# Patient Record
Sex: MALE | Race: WHITE | NOT HISPANIC OR LATINO | URBAN - METROPOLITAN AREA
[De-identification: names, ages, dates, MRNs, and addresses within clinical notes are randomized per-mention and may not be internally consistent; named-entity substitution may affect disease eponyms.]

---

## 2018-06-01 ENCOUNTER — OUTPATIENT (OUTPATIENT)
Dept: OUTPATIENT SERVICES | Facility: HOSPITAL | Age: 53
LOS: 1 days | End: 2018-06-01
Payer: MEDICAID

## 2018-06-01 PROCEDURE — G9001: CPT

## 2018-06-22 DIAGNOSIS — R69 ILLNESS, UNSPECIFIED: ICD-10-CM

## 2018-07-01 ENCOUNTER — OUTPATIENT (OUTPATIENT)
Dept: OUTPATIENT SERVICES | Facility: HOSPITAL | Age: 53
LOS: 1 days | End: 2018-07-01

## 2018-07-06 DIAGNOSIS — Z71.89 OTHER SPECIFIED COUNSELING: ICD-10-CM

## 2018-08-01 ENCOUNTER — OUTPATIENT (OUTPATIENT)
Dept: OUTPATIENT SERVICES | Facility: HOSPITAL | Age: 53
LOS: 1 days | End: 2018-08-01

## 2018-08-05 ENCOUNTER — EMERGENCY (EMERGENCY)
Facility: HOSPITAL | Age: 53
LOS: 1 days | Discharge: OTHER INSTITUTION | End: 2018-08-05
Attending: EMERGENCY MEDICINE
Payer: MEDICARE

## 2018-08-05 VITALS — WEIGHT: 177.91 LBS | HEIGHT: 66 IN

## 2018-08-05 PROCEDURE — 99285 EMERGENCY DEPT VISIT HI MDM: CPT | Mod: 25

## 2018-08-05 NOTE — ED PROVIDER NOTE - GASTROINTESTINAL NEGATIVE STATEMENT, MLM
PSYCHIATRY INPATIENT ADMISSION NOTE    Patient: Arjun Davis  Date: 2017    :     1968  Attending: Rudy Son MD      SOURCE OF INFORMATION:  I based this report upon my review of information from written and electronic medical records and upon my interview and assessment of the patient's condition.    CHIEF COMPLAINT: \"I had this panic attack\"    This admission was Voluntary.    HISTORY OF PRESENTING ILLNESS:  Mr Davis describes that every few years he ends up experiencing episodes of extreme anxiety which he suspects are usually triggered by life stressors. He then starts talking about some of the significant stressors that he has recently been involved with such as the stress at his job because recently he was promoted into a position that requires more of his time. He briefly talks about an incident at his job that happened which required much of his time. He also talks about stressors at home.  He has had increasing anxiety because of these stressors and wanted to make an appointment with his outpatient psychiatrist but was unable to do so in a timely manner and so, he ended up going to the emergency department and this is where he had a panic attack. Per emergency department notes, patient began to run in the hallway frantically saying \"I'm going to kill myself\". At one point he even ran into the bathroom and shut the door. When security arrived, patient calmed down and was redirected back to his room. Once he was back in his room, he stated that he does not want to kill himself and that he was very scared. He describes his panic attacks as \"worried about dying\". He also endorses many other symptoms such as fear of losing control, palpitations, sweating, trembling, shortness of breath. In the past, he has had thoughts that he may end up \"losing my soul\". He has also had thoughts that the \"world may be ending\".     He recalls that Zyprexa has helped him with such panic attacks in the past  however, he has gained tremendous amount of weight with this medication. He wonders if he should be prescribed this medication for the next few weeks and then it can be tapered off after that. It was explained to patient that what he has been experiencing for the last few years are likely panic attacks. There is no evidence that makes me suspect that this patient has an underlying psychotic disorder. It was discussed with him that medications such as Zoloft which he has been on in the past are FDA approved for depression, anxiety and panic disorder. On the other hand, medication such as Wellbutrin although they help raise a persons energy levels, such medication can exacerbate anxiety and potentially also lead to panic attacks. It was recommended to him that he should consider getting back on Zoloft and perhaps consider tapering off Wellbutrin in the near future. He said that he will think about this. As far as Zyprexa goes, I do not see any indication to start this medication now that the panic attacks has abated and to expose patient to the metabolic side effects of Zyprexa. All this was discussed with him and he acknowledged understanding of this.     PSYCH REVIEW OF SYSTEMS:    - Depression: Patient reports feeling hopeless, helpless, decreased concentration, decreased energy, guilty feelings, decreased interest.  - Anxiety: Patient reports feeling tense at shoulders/neck, worrying about day to day things, feeling fatigue, restless, irritable  - Panic disorder:  Patient reported clear history of recurrent panic attacks  - Bipolar disorder:  Patient reported no clear history of > 7day episodes of elevated, expansive or irritable mood, accompanied by decreased need for sleep, rapid or disorganized thoughts, grandiosity, and high risk behavior.  - Post-Traumatic Stress Disorder:  Patient reported no history of traumatic events followed by reexperiencing, avoidance, and hyper-arousal symptoms  - OCD:  Patient  reported no clear history of intrusive, bothersome and interfering obsessions or compulsive rituals   - Appetite: fair  - Sleep: fair  - Auditory/Visual hallucinations/Paranoia: Denies  - Suicidal behavior:  Denies  - Homicidal thoughts: Denies  - ADHD:  Patient reported no clear history of ADHD like persistent inattention and hyperactivity/impulsivity symptoms  - Eating disorder:  Patient reported no clear history of bingeing, purging, restricting or other eating disordered behaviors  - Trichotillomania: Denies    Past Psychiatric History (patient)   Past psychiatric diagnosis: Delusional disorder, bipolar disorder, psychotic disorder, depression, seasonal affective disorder  Current psychiatrist: Dr Brachmann  Current therapist: Lukas  Past psychotropic trials: Zyprexa, Zoloft, Geodon, Prozac, Latuda  Past psychiatric hospitalizations: None  Past chemical dependence programs: None   Self injurious behaviors/suicide attempts: None  Violence towards others: None  Past commitments: None  ECT history: None    Past Psychiatric History (family)   Suicide attempts: none  Chemical dependence: none  Mental illness:  Brother may have mental illness    Social History  Family/Friends: Wife of 18 years. 2 children  Job:  position at a construction company.  School: Bachelor's degree  Living situation: With his wife and children  Past abuse:None   history: None  Current stressors: See HPI for details.  Guns access: Yes but he denies SI  Past/current legal issues: None    Substance Use:   Alcohol: Denies  Cocaine/crack: Denies  Meth: Denies  Heroin: Denies  Cannabis: None  Cigarettes: Denies    MEDICAL HISTORY:  I have reviewed and updated the electronic health record regarding significant medical and psychiatric disorders. Findings of significance include:    Past Medical History   Diagnosis Date   • Anxiety    • Depressive disorder    • RAD (reactive airway disease)        Vitals:    01/31/17 0840   BP: (!)  137/102   Pulse: 91   Resp:    Temp:        Past Surgical History   Procedure Laterality Date   • Breast surgery       removal of extra breast tissue         MEDICATIONS:  Outpatient medications:  Prescriptions Prior to Admission   Medication Sig Dispense Refill   • vitamin - therapeutic multivitamins w/minerals (CENTRUM SILVER,THERA-M) Tab Take 1 tablet by mouth daily.     • cetirizine (ZYRTEC) 10 MG tablet Take 10 mg by mouth daily.     • UNKNOWN TO PATIENT Take 1 capsule by mouth 2 times daily as needed (diet suppressant).     • buPROPion (WELLBUTRIN SR) 200 MG 12 hr tablet TAKE 1 TABLET BY MOUTH DAILY 90 tablet 2   • ADVAIR DISKUS 250-50 MCG/DOSE AEROSOL POWDER, BREATH ACTIVATED INHALE 1 PUFF BY MOUTH INTO THE LUNGS TWICE DAILY (Patient taking differently: daily) 1 each 0   • albuterol 108 (90 BASE) MCG/ACT inhaler Inhale 2 puffs into the lungs every 4 hours as needed for Shortness of Breath or Wheezing. 1 Inhaler 5     Scheduled meds:  • loratadine  10 mg Oral QAM AC   • sertraline  50 mg Oral Daily   • fluticasone-salmeterol  1 puff Inhalation 2 times per day   • buPROPion  200 mg Oral Daily     Prn meds  albuterol, zolpidem, benztropine mesylate **OR** benztropine, LORazepam **OR** LORazepam, OLANZapine, haloperidol **OR** haloperidol, docusate sodium-sennosides, aluminum-magnesium hydroxide-simethicone, ondansetron, loperamide    ALLERGIES:   Allergen Reactions   • Geodon [Ziprasidone Hydrochloride] Other (See Comments)     Tongue thrusting     FAMILY HISTORY:  Family History   Problem Relation Age of Onset   • Depression Brother    • Heart disease Father    • Cancer Father      breast ca   • Heart disease Paternal Grandmother    • Cancer Maternal Grandfather      colon ca       MEDICAL REVIEW OF SYSTEMS:  Constitutional:  Denies fever/sweats  Cardiovascular:  Denies new onset chest pain, palpitations  Respiratory:  Denies cough, shortness of breath  Gastrointestinal:  Denies abdominal pain/cramping, nausea,  vomiting  Musculoskeletal:  Denies new onset joint pain, back, neck pain  Skin:  Denies new spot on skin, lumps, or bumps  Neurologic:  Denies new onset sensory loss or weakness    PHYSICAL EXAM  Initial admission consultation ordered and reviewed.    MENTAL STATUS EXAM  General appearance: Distressed  Speech  soft  Volume: soft  Latency: prolonged  Mood/affect: depressed, anxious  Psychomotor: slow  Associations: intact  Thought process: intact  Thought content: Denies HI/SI.   Perceptual disorders/hallucinations: none  Orientation: oriented to person, place, time, and general circumstances  Attention: decreased  Concentration: decreased  Language: Normal  Fund of knowledge: average  Memory: no apparent impairments in short term memory and no apparent impairments in long term memory   Insight: fair  Judgement: limited    ASSESSMENT:  Principal Admission Diagnosis  Major depressive disorder, recurrent, moderate  Panic disorder    Evidence for 2 overnight stay: depression    TREATMENT PLAN    Medications: Start Zoloft. Consider tapering Wellbutrin.  Labs:labs reviewed  Precautions:unit restriction with q 15 minute safety checks because of significant suicidal/self-injury risk  Nicotine replacement -  none  Medical consult for history and physical   Milieu therapy  Diet: Regular expect foods, if any, in allergy list.  Broset violence protocol.  Full resuscitation  Vitals  Psychoeducation, supportive therapy, recovery oriented, compliance, relapse prevention as appropriate  Collateral: none today  Symptoms discussed and validated, support and psycho-education provided. Discussed diagnosis, recommended treatment, alternative treatment options, and the right to refuse treatment. Risks and benefits of psychotropic medications discussed, including but not limited to serotonin syndrome, NMS, TD, akathesia, movement disorders, GI    distress, headaches, dizziness, allergy/rash, metabolic syndrome, weight gain, sexual  dysfunction, increased SI and seizure. Risks of combining medications with alcohol/illicits discussed. Pt was given opportunity to ask questions, there was no educational barriers to learning and all questions were answered. Pt verbalized understanding of medication education and training, voiced understanding of treatment plan, acceptance of potential risks and consequences, and has provided informed consent and agreed to take these medications.     DISCHARGE PLAN  Patient can sustain progress and resolve remaining treatment goals in a less restrictive environment (i.e., IOP, PHP, Outpatient Services).    Estimated length of stay: 4-5 days     Next Level of Care: PHP vs IOP vs Outpatient care.    The patient will be discharged to the next level of care when inpatient care is no longer necessary due to adequate patient stabilization or improvement as indicated by ALL of the following:    Inpatient care no longer appropriate du to patient progress record or consent as indicated by 1 or more of the following: Patient can sustain progress and resolve remaining treatment goals in a less restrictive environment (i.e., IOP, PHP, Outpatient Services).    RISK Factors:  Risk of violence to self  Attempts of suicide in past 6 months:No    History of interpersonal violence prior to 6 months:  History of arrests for serious violent crime (robbery, sexual assault, assault/battery, weapons charge, murder) in the past six months:No    Psychological trauma screening  Lifetime history physical, sexual, emotional or verbal abuse:  Have you ever been verbally, emotionally, physically or sexually abused:No  At what age: Not applicable    Lifetime drug use:  Reported pattern of substance abuse within the last 12 months:No    Lifetime alcohol use:  Reported pattern of alcohol use within the last 12 months:No    Strengths (minimum of two): Internally motivated to seek treatment, Verbalizes optimism that change can occur, Willing to  obtain outpatient follow-up treatment after discharge from current level of care     This information is confidential. Any disclosure without the patient's consent or Statutory Authorization is prohibited by law.    Rudy Son MD  Inpatient Psychiatry  Pager: [phone]     no abdominal pain, no bloating, no constipation, no diarrhea, no nausea and no vomiting.

## 2018-08-05 NOTE — ED PROVIDER NOTE - OBJECTIVE STATEMENT
52 y/o M pt presents to ED with his wife for increase depression and suicidal ideation. Pt admits to drinking alcohol and states he's depressed and wants to shoot himself in the head. Hx of Inpatient hospitalization 10 years ago; no medication given.denies fever. denies HA or neck pain. no chest pain or sob. no abd pain. no n/v/d. no urinary f/u/d. no back pain. no motor or sensory deficits. denies illicit drug use. no recent travel. no rash. no auditory/visual hallucinations.  no other acute issues symptoms or concerns

## 2018-08-05 NOTE — ED PROVIDER NOTE - PROGRESS NOTE DETAILS
Mushtaq: received sign out, medically cleared prior to my arrival; admit for inpatient psychiatric care

## 2018-08-05 NOTE — ED ADULT TRIAGE NOTE - CHIEF COMPLAINT QUOTE
patients wife states that he is depressed and down, patient states that he is suicidal with no plan - patient acting bizarre in waiting room and was drinking earlier

## 2018-08-06 VITALS
DIASTOLIC BLOOD PRESSURE: 80 MMHG | HEART RATE: 74 BPM | OXYGEN SATURATION: 96 % | RESPIRATION RATE: 18 BRPM | TEMPERATURE: 99 F | SYSTOLIC BLOOD PRESSURE: 122 MMHG

## 2018-08-06 DIAGNOSIS — F10.10 ALCOHOL ABUSE, UNCOMPLICATED: ICD-10-CM

## 2018-08-06 DIAGNOSIS — F14.10 COCAINE ABUSE, UNCOMPLICATED: ICD-10-CM

## 2018-08-06 DIAGNOSIS — R69 ILLNESS, UNSPECIFIED: ICD-10-CM

## 2018-08-06 DIAGNOSIS — F33.1 MAJOR DEPRESSIVE DISORDER, RECURRENT, MODERATE: ICD-10-CM

## 2018-08-06 LAB
ALBUMIN SERPL ELPH-MCNC: 5 G/DL — SIGNIFICANT CHANGE UP (ref 3.3–5.2)
ALP SERPL-CCNC: 95 U/L — SIGNIFICANT CHANGE UP (ref 40–120)
ALT FLD-CCNC: 25 U/L — SIGNIFICANT CHANGE UP
AMPHET UR-MCNC: NEGATIVE — SIGNIFICANT CHANGE UP
ANION GAP SERPL CALC-SCNC: 17 MMOL/L — SIGNIFICANT CHANGE UP (ref 5–17)
APAP SERPL-MCNC: <7.5 UG/ML — LOW (ref 10–26)
APPEARANCE UR: CLEAR — SIGNIFICANT CHANGE UP
AST SERPL-CCNC: 24 U/L — SIGNIFICANT CHANGE UP
BARBITURATES UR SCN-MCNC: NEGATIVE — SIGNIFICANT CHANGE UP
BASOPHILS # BLD AUTO: 0 K/UL — SIGNIFICANT CHANGE UP (ref 0–0.2)
BASOPHILS NFR BLD AUTO: 0.5 % — SIGNIFICANT CHANGE UP (ref 0–2)
BENZODIAZ UR-MCNC: POSITIVE
BILIRUB SERPL-MCNC: 0.3 MG/DL — LOW (ref 0.4–2)
BILIRUB UR-MCNC: NEGATIVE — SIGNIFICANT CHANGE UP
BUN SERPL-MCNC: 10 MG/DL — SIGNIFICANT CHANGE UP (ref 8–20)
CALCIUM SERPL-MCNC: 9.5 MG/DL — SIGNIFICANT CHANGE UP (ref 8.6–10.2)
CHLORIDE SERPL-SCNC: 105 MMOL/L — SIGNIFICANT CHANGE UP (ref 98–107)
CO2 SERPL-SCNC: 23 MMOL/L — SIGNIFICANT CHANGE UP (ref 22–29)
COCAINE METAB.OTHER UR-MCNC: POSITIVE
COLOR SPEC: YELLOW — SIGNIFICANT CHANGE UP
CREAT SERPL-MCNC: 1.04 MG/DL — SIGNIFICANT CHANGE UP (ref 0.5–1.3)
DIFF PNL FLD: NEGATIVE — SIGNIFICANT CHANGE UP
EOSINOPHIL # BLD AUTO: 0.2 K/UL — SIGNIFICANT CHANGE UP (ref 0–0.5)
EOSINOPHIL NFR BLD AUTO: 2.6 % — SIGNIFICANT CHANGE UP (ref 0–5)
ETHANOL SERPL-MCNC: 165 MG/DL — SIGNIFICANT CHANGE UP
GLUCOSE SERPL-MCNC: 84 MG/DL — SIGNIFICANT CHANGE UP (ref 70–115)
GLUCOSE UR QL: NEGATIVE MG/DL — SIGNIFICANT CHANGE UP
HCT VFR BLD CALC: 44.7 % — SIGNIFICANT CHANGE UP (ref 42–52)
HGB BLD-MCNC: 15 G/DL — SIGNIFICANT CHANGE UP (ref 14–18)
KETONES UR-MCNC: NEGATIVE — SIGNIFICANT CHANGE UP
LEUKOCYTE ESTERASE UR-ACNC: NEGATIVE — SIGNIFICANT CHANGE UP
LYMPHOCYTES # BLD AUTO: 3.2 K/UL — SIGNIFICANT CHANGE UP (ref 1–4.8)
LYMPHOCYTES # BLD AUTO: 39.4 % — SIGNIFICANT CHANGE UP (ref 20–55)
MCHC RBC-ENTMCNC: 29.8 PG — SIGNIFICANT CHANGE UP (ref 27–31)
MCHC RBC-ENTMCNC: 33.6 G/DL — SIGNIFICANT CHANGE UP (ref 32–36)
MCV RBC AUTO: 88.9 FL — SIGNIFICANT CHANGE UP (ref 80–94)
METHADONE UR-MCNC: NEGATIVE — SIGNIFICANT CHANGE UP
MONOCYTES # BLD AUTO: 0.5 K/UL — SIGNIFICANT CHANGE UP (ref 0–0.8)
MONOCYTES NFR BLD AUTO: 6.2 % — SIGNIFICANT CHANGE UP (ref 3–10)
NEUTROPHILS # BLD AUTO: 4.1 K/UL — SIGNIFICANT CHANGE UP (ref 1.8–8)
NEUTROPHILS NFR BLD AUTO: 51.2 % — SIGNIFICANT CHANGE UP (ref 37–73)
NITRITE UR-MCNC: NEGATIVE — SIGNIFICANT CHANGE UP
OPIATES UR-MCNC: NEGATIVE — SIGNIFICANT CHANGE UP
PCP SPEC-MCNC: SIGNIFICANT CHANGE UP
PCP UR-MCNC: NEGATIVE — SIGNIFICANT CHANGE UP
PH UR: 5 — SIGNIFICANT CHANGE UP (ref 5–8)
PLATELET # BLD AUTO: 279 K/UL — SIGNIFICANT CHANGE UP (ref 150–400)
POTASSIUM SERPL-MCNC: 3.9 MMOL/L — SIGNIFICANT CHANGE UP (ref 3.5–5.3)
POTASSIUM SERPL-SCNC: 3.9 MMOL/L — SIGNIFICANT CHANGE UP (ref 3.5–5.3)
PROT SERPL-MCNC: 8 G/DL — SIGNIFICANT CHANGE UP (ref 6.6–8.7)
PROT UR-MCNC: NEGATIVE MG/DL — SIGNIFICANT CHANGE UP
RBC # BLD: 5.03 M/UL — SIGNIFICANT CHANGE UP (ref 4.6–6.2)
RBC # FLD: 13.5 % — SIGNIFICANT CHANGE UP (ref 11–15.6)
SALICYLATES SERPL-MCNC: <0.6 MG/DL — LOW (ref 10–20)
SODIUM SERPL-SCNC: 145 MMOL/L — SIGNIFICANT CHANGE UP (ref 135–145)
SP GR SPEC: 1.02 — SIGNIFICANT CHANGE UP (ref 1.01–1.02)
THC UR QL: NEGATIVE — SIGNIFICANT CHANGE UP
TSH SERPL-MCNC: 1.37 UIU/ML — SIGNIFICANT CHANGE UP (ref 0.27–4.2)
UROBILINOGEN FLD QL: NEGATIVE MG/DL — SIGNIFICANT CHANGE UP
WBC # BLD: 8.1 K/UL — SIGNIFICANT CHANGE UP (ref 4.8–10.8)
WBC # FLD AUTO: 8.1 K/UL — SIGNIFICANT CHANGE UP (ref 4.8–10.8)

## 2018-08-06 PROCEDURE — 80307 DRUG TEST PRSMV CHEM ANLYZR: CPT

## 2018-08-06 PROCEDURE — 96374 THER/PROPH/DIAG INJ IV PUSH: CPT

## 2018-08-06 PROCEDURE — 81003 URINALYSIS AUTO W/O SCOPE: CPT

## 2018-08-06 PROCEDURE — 99284 EMERGENCY DEPT VISIT MOD MDM: CPT

## 2018-08-06 PROCEDURE — 99285 EMERGENCY DEPT VISIT HI MDM: CPT | Mod: 25

## 2018-08-06 PROCEDURE — 85027 COMPLETE CBC AUTOMATED: CPT

## 2018-08-06 PROCEDURE — 93005 ELECTROCARDIOGRAM TRACING: CPT

## 2018-08-06 PROCEDURE — 93010 ELECTROCARDIOGRAM REPORT: CPT

## 2018-08-06 PROCEDURE — 36415 COLL VENOUS BLD VENIPUNCTURE: CPT

## 2018-08-06 PROCEDURE — 80053 COMPREHEN METABOLIC PANEL: CPT

## 2018-08-06 PROCEDURE — 84443 ASSAY THYROID STIM HORMONE: CPT

## 2018-08-06 RX ORDER — ASPIRIN/CALCIUM CARB/MAGNESIUM 324 MG
81 TABLET ORAL DAILY
Qty: 0 | Refills: 0 | Status: DISCONTINUED | OUTPATIENT
Start: 2018-08-06 | End: 2018-08-10

## 2018-08-06 RX ADMIN — Medication 81 MILLIGRAM(S): at 13:13

## 2018-08-06 RX ADMIN — Medication 2 MILLIGRAM(S): at 00:30

## 2018-08-06 NOTE — ED BEHAVIORAL HEALTH ASSESSMENT NOTE - HPI (INCLUDE ILLNESS QUALITY, SEVERITY, DURATION, TIMING, CONTEXT, MODIFYING FACTORS, ASSOCIATED SIGNS AND SYMPTOMS)
Pt is a 52 y/o male with PMHx of MI x 3 years ago s/p 2 stents and depression presenting to ED with suicidal ideations and increase in depression x 1 week. Previous history of psychiatric inpatient admission in december of 2006 at [facility] for SI and depression and [facility] March 2016 for SI and depression. Hx of various antidepressants (Zoloft, Prozac, Buspar). Pt states " I can't take it, I feel like my world in crashing in on me." Patient reports suicidal thoughts that have been increasing over the past week but no plan or intent at the moment. Pt states " I feel like going to sleep and just not waking up". Pt reports long history of depression but it started getting worse when he was released 3months ago from assisted after a 6month stay for DWI charges, and has gotten even worse of past week. Pt reports he was sober from alcohol for 2 years but decided to drink yesterday 5-6 beers to "numb the pain", as well as taking some xanax for same reason. Pt reports attending [facility] in [place] and sees a therapist there ([facility]) and reports telling the therapist that he needs more help but they could not give him a psychiatrist appointment until October 2018. Associated insomnia, fatigue, decrease appetite, anhedonia, irritability, feelings of hopelessness, worthlessness, guilty, and burden of others. Denies AH, VH, and delusions. Spoke with mother Tanna at  who reports noticing the patients getting worse after being released from assisted 3 months ago. Mother reports that in past few months he has made comments like " All I'm doing is hurting everyone, I want out", " just put a bullet in my head". Mother reports that before his inpatient admission at [facility] in 2006 he tried to jump from the car, she believes as suicide attempt. Patient's mother states " I really think he needs help, I think he will get worse if he is just discharged and sent home."

## 2018-08-06 NOTE — ED BEHAVIORAL HEALTH ASSESSMENT NOTE - RISK ASSESSMENT
high risk: hx of depression, previous inpatient admission, feelings of burden on others, hopeless, worthless, guilty, anhedonia, recent incarceration, lives with mother, unemployed, , current SI without plan

## 2018-08-06 NOTE — ED ADULT NURSE REASSESSMENT NOTE - NS ED NURSE REASSESS COMMENT FT1
received pt medically cleared by Ed attending, pt awake alert and oriented x3, affect flat, appears to be thought blocking at times, states "sometimes I want to go to sleep and not wake up" pt also states " I'm feed up with life" pt asked about a plan to hurt himself and stated " How about waiting for the next train to come by" pt states he drinks beer daily but denies any illicit drug use, pt admits to a previous in-patient psych admission un sure of how long ago, pt cooperative.

## 2018-08-06 NOTE — ED ADULT NURSE NOTE - OBJECTIVE STATEMENT
Pt. received resting in yellow gown on 1:1 for suicidal thoughts. As per wife pt. has been feeling depressed as of late, admits to drinking tonight, states he wants to "blow his head off". environment safe, patient calm at this time. respirations even and unlabored, requesting something to eat and drink

## 2018-08-06 NOTE — ED BEHAVIORAL HEALTH NOTE - BEHAVIORAL HEALTH NOTE
Social Work Note: clinicals were reviewed by SO and patient has been accepted at this time, confirmed with Reva in Admissions. Accepting MD is Dr. Kaur 9.13 status. [facility] was called for transfer spoke with Diya

## 2018-08-06 NOTE — ED BEHAVIORAL HEALTH ASSESSMENT NOTE - DESCRIPTION
T(C): 36.6 (06 Aug 2018 02:39), Max: 37.2 (05 Aug 2018 23:46)  T(F): 97.8 (06 Aug 2018 02:39), Max: 99 (05 Aug 2018 23:46)  HR: 82 (06 Aug 2018 02:39) (82 - 95)  BP: 113/71 (06 Aug 2018 02:39) (113/71 - 124/80)  RR: 18 (06 Aug 2018 02:39) (18 - 18)  SpO2: 96% (06 Aug 2018 02:39) (94% - 96%) MI x 3 years ago s/p 2 stents unemployed, domiciled with mother in [place], , 3 children

## 2018-08-06 NOTE — ED BEHAVIORAL HEALTH ASSESSMENT NOTE - DESCRIPTION (FIRST USE, LAST USE, QUANTITY, FREQUENCY, DURATION)
1/2 ppd hx of 2 year sobriety, started drinking yesterday 5-6 beers states " I use seldom every weekend" 1/2 gram each time uses 0.5 mg of xanax occasionally to "calm nerves "

## 2018-08-06 NOTE — ED BEHAVIORAL HEALTH ASSESSMENT NOTE - SUMMARY
pt is a 52 y/o male with PMHx of MI x 3 year s/p stents on prophylactic medications, and hx of depression, being evaluated for increase in depressive symptoms and suicidal ideations, previous hospitalizations dec 2006 and march 2016 at [facility] for SI and depression, released from nursing home x 3months ago for DWI charges, hx of various antidepressants, requiring inpatient psychiatric admission for SI, depression, and initiating medication.

## 2018-08-06 NOTE — ED BEHAVIORAL HEALTH ASSESSMENT NOTE - SUICIDE RISK FACTORS
Anhedonia/Mood episode/Perceived burden on family and others/Hopelessness/Substance abuse/dependence

## 2018-08-06 NOTE — ED BEHAVIORAL HEALTH ASSESSMENT NOTE - OTHER PAST PSYCHIATRIC HISTORY (INCLUDE DETAILS REGARDING ONSET, COURSE OF ILLNESS, INPATIENT/OUTPATIENT TREATMENT)
hx of depression x 12 years, previous hospitalization dec 5th-dec 8th in 2006 at [facility] for depression and SI, march 1st-march 2nd 2016 for Si and depression at [facility]

## 2018-08-07 DIAGNOSIS — Z71.89 OTHER SPECIFIED COUNSELING: ICD-10-CM

## 2024-08-26 NOTE — ED BEHAVIORAL HEALTH ASSESSMENT NOTE - ACCOMPANIED BY
Last clinic notes faxed over.     ----- Message from Caryn Strauss sent at 8/26/2024 11:31 AM CDT -----  Contact: Ms. Chowdary [phone]  [email or web address]     Patient is calling for Medical Advice regarding:    How long has patient had these symptoms:    Pharmacy name and phone#:    Patient wants a call back      Comments: Ms. Chowdary with [facility] to get clinic notes for the pt Feeding Supplies Please fax the notes to [phone]        Please advise patient replies from provider may take up to 48 hours.   Self